# Patient Record
Sex: MALE | Race: BLACK OR AFRICAN AMERICAN | Employment: FULL TIME | URBAN - METROPOLITAN AREA
[De-identification: names, ages, dates, MRNs, and addresses within clinical notes are randomized per-mention and may not be internally consistent; named-entity substitution may affect disease eponyms.]

---

## 2021-10-20 ENCOUNTER — HOSPITAL ENCOUNTER (EMERGENCY)
Age: 46
Discharge: HOME | End: 2021-10-20

## 2021-10-20 VITALS
OXYGEN SATURATION: 98 % | DIASTOLIC BLOOD PRESSURE: 99 MMHG | RESPIRATION RATE: 20 BRPM | HEART RATE: 80 BPM | HEIGHT: 74 IN | WEIGHT: 240 LBS | BODY MASS INDEX: 30.8 KG/M2 | SYSTOLIC BLOOD PRESSURE: 149 MMHG | TEMPERATURE: 97.7 F

## 2021-10-20 DIAGNOSIS — M10.9 ACUTE GOUT OF ANKLE, UNSPECIFIED CAUSE, UNSPECIFIED LATERALITY: Primary | ICD-10-CM

## 2021-10-20 PROCEDURE — 99211 OFF/OP EST MAY X REQ PHY/QHP: CPT

## 2021-10-20 RX ORDER — INDOMETHACIN 50 MG/1
50 CAPSULE ORAL 3 TIMES DAILY PRN
Qty: 20 CAPSULE | Refills: 0 | Status: SHIPPED | OUTPATIENT
Start: 2021-10-20

## 2021-10-20 NOTE — ED PROVIDER NOTES
Department of Emergency [address] [facility]  Provider Note  Admit Date/Time: 10/20/2021  8:11 AM  Room:   NAME: Sherry Sun  : 1975  MRN: [id number]     Chief Complaint:  Ankle Pain (Believes to be a gout attack has not had in a couple of years)    History of Present Illness        Sherry Sun is a 51 y.o. male who has a past medical history of: History reviewed. No pertinent past medical history. presents to the urgent care center by private car for a gout attack in his left ankle. He has had gout before and it usually does hit in his ankle but he has not had an episode in about 2 years he said there was no injury to his ankle is just painful and swollen. ROS    Pertinent positives and negatives are stated within HPI, all other systems reviewed and are negative. History reviewed. No pertinent surgical history. Social History:  reports that he has been smoking cigarettes. He has been smoking about 0.50 packs per day. He does not have any smokeless tobacco history on file. He reports that he does not drink alcohol. Family History: family history is not on file. Allergies: Patient has no known allergies. Physical Exam   Oxygen Saturation Interpretation: Normal.   ED Triage Vitals [10/20/21 0818]   BP Temp Temp src Pulse Resp SpO2 Height Weight   (!) 149/99 97.7 °F (36.5 °C) -- 80 20 98 % 6' 2\" (1.88 m) 240 lb (108.9 kg)       Physical Exam  · Constitutional/General: Alert and oriented x3, well appearing, non toxic in NAD  · HEENT:  NC/NT. clear  Airway patent. · Neck: Supple, full ROM,   · Respiratory: . Not in respiratory distress  · CV:  Regular rate. Regular rhythm. · GI:  Abdomen Soft,   · Musculoskeletal: Moves all extremities x 4. left ankle is edematous circumferential, slightly warm to touch no erythema. No open areas   · integument: skin warm and dry. No rashes.    · Lymphatic: no lymphadenopathy noted  · Neurologic: GCS 15, no focal deficits,  · Psychiatric: Normal Affect    Lab / Imaging Results   (All laboratory and radiology results have been personally reviewed by myself)  Labs:  No results found for this visit on 10/20/21. Imaging: All Radiology results interpreted by Radiologist unless otherwise noted. No orders to display       ED Course / Medical Decision Making   Medications - No data to display       Consult(s):   None      MDM:   Patient with a gout attack. He said there was no injury to his ankle but he has had gout in it before. It is painful and swollen I did start him on indomethacin he was given a work excuse for last night and tonight and advised to follow-up with his doctor        Assessment      1. Acute gout of ankle, unspecified cause, unspecified laterality      Plan   Discharge to home and advised to contact call the referral line to get established with a Dr  [phone]       Patient condition is good    New Medications     New Prescriptions    INDOMETHACIN (INDOCIN) 50 MG CAPSULE    Take 1 capsule by mouth 3 times daily as needed for Pain (for gout attack) Take with food     Electronically signed by DALILA Mckeon CNP   DD: 10/20/21  **This report was transcribed using voice recognition software. Every effort was made to ensure accuracy; however, inadvertent computerized transcription errors may be present.   END OF ED PROVIDER NOTE     DALILA Mckeon CNP  10/20/21 [id number]

## 2021-10-20 NOTE — LETTER
[facility]  [address] [place] [id number]  Phone: [phone]               October 20, 2021    Patient: Hawk Givens   YOB: 1975   Date of Visit: 10/20/2021       To Whom It May Concern:    Katherin Conroy was seen and treated in our emergency department on 10/20/2021. He was absent from work on 10/20 and 10/21 due to medical reasons.       Sincerely,       DALILA Wakefield CNP         Signature:__________________________________